# Patient Record
Sex: MALE | Race: WHITE | NOT HISPANIC OR LATINO | Employment: FULL TIME | URBAN - METROPOLITAN AREA
[De-identification: names, ages, dates, MRNs, and addresses within clinical notes are randomized per-mention and may not be internally consistent; named-entity substitution may affect disease eponyms.]

---

## 2017-09-13 ENCOUNTER — HOSPITAL ENCOUNTER (EMERGENCY)
Facility: HOSPITAL | Age: 32
Discharge: HOME | End: 2017-09-13
Attending: EMERGENCY MEDICINE
Payer: OTHER MISCELLANEOUS

## 2017-09-13 VITALS
OXYGEN SATURATION: 100 % | RESPIRATION RATE: 18 BRPM | DIASTOLIC BLOOD PRESSURE: 90 MMHG | HEIGHT: 67 IN | SYSTOLIC BLOOD PRESSURE: 135 MMHG | BODY MASS INDEX: 25.9 KG/M2 | TEMPERATURE: 99 F | WEIGHT: 165 LBS | HEART RATE: 79 BPM

## 2017-09-13 DIAGNOSIS — M79.672 LEFT FOOT PAIN: Primary | ICD-10-CM
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
## 2017-09-13 DIAGNOSIS — S99.929A FOOT TRAUMA: ICD-10-CM
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
## 2017-09-13 DIAGNOSIS — T14.90XA INJURY: ICD-10-CM

## 2017-09-13 PROCEDURE — 90471 IMMUNIZATION ADMIN: CPT | Performed by: EMERGENCY MEDICINE

## 2017-09-13 PROCEDURE — 90715 TDAP VACCINE 7 YRS/> IM: CPT | Performed by: EMERGENCY MEDICINE

## 2017-09-13 PROCEDURE — 29515 APPLICATION SHORT LEG SPLINT: CPT

## 2017-09-13 PROCEDURE — 99283 EMERGENCY DEPT VISIT LOW MDM: CPT | Mod: 25

## 2017-09-13 PROCEDURE — 25000003 PHARM REV CODE 250: Performed by: EMERGENCY MEDICINE

## 2017-09-13 PROCEDURE — 63600175 PHARM REV CODE 636 W HCPCS: Performed by: EMERGENCY MEDICINE

## 2017-09-13 PROCEDURE — 99284 EMERGENCY DEPT VISIT MOD MDM: CPT | Mod: ,,, | Performed by: PHYSICIAN ASSISTANT

## 2017-09-13 RX ORDER — NAPROXEN 500 MG/1
500 TABLET ORAL
Status: COMPLETED | OUTPATIENT
Start: 2017-09-13 | End: 2017-09-13

## 2017-09-13 RX ORDER — NAPROXEN 500 MG/1
500 TABLET ORAL 2 TIMES DAILY WITH MEALS
Qty: 20 TABLET | Refills: 0 | Status: SHIPPED | OUTPATIENT
Start: 2017-09-13 | End: 2019-11-07

## 2017-09-13 RX ADMIN — CLOSTRIDIUM TETANI TOXOID ANTIGEN (FORMALDEHYDE INACTIVATED), CORYNEBACTERIUM DIPHTHERIAE TOXOID ANTIGEN (FORMALDEHYDE INACTIVATED), BORDETELLA PERTUSSIS TOXOID ANTIGEN (GLUTARALDEHYDE INACTIVATED), BORDETELLA PERTUSSIS FILAMENTOUS HEMAGGLUTININ ANTIGEN (FORMALDEHYDE INACTIVATED), BORDETELLA PERTUSSIS PERTACTIN ANTIGEN, AND BORDETELLA PERTUSSIS FIMBRIAE 2/3 ANTIGEN 0.5 ML: 5; 2; 2.5; 5; 3; 5 INJECTION, SUSPENSION INTRAMUSCULAR at 07:09

## 2017-09-13 RX ADMIN — NAPROXEN 500 MG: 500 TABLET ORAL at 07:09

## 2017-09-13 NOTE — ED TRIAGE NOTES
Presents to ER with abrasion atop left foot after a heavy object fell on it this am.  Swelling noted to foot but is able to wiggle his toes without difficulty.    GENERAL: The patient is well-developed and well-nourished in no apparent distress. Alert and oriented x4.                                                HEENT: Head is normocephalic and atraumatic. Extraocular muscles are intact. Pupils are equal, round, and reactive to light and accommodation. Nares appeared normal. Mouth is well hydrated and without lesions. Mucous membranes are moist. Posterior pharynx clear of any exudate or lesions.    NECK: Supple. No carotid bruits. No lymphadenopathy or thyromegaly.    LUNGS: Clear to auscultation.    HEART: Regular rate and rhythm without murmur.     ABDOMEN: Soft, nontender, and nondistended. Positive bowel sounds. No hepatosplenomegaly was noted.     EXTREMITIES: Without any cyanosis, clubbing, rash, or lesions.    NEUROLOGIC: Cranial nerves II through XII are grossly intact.     PSYCHIATRIC: Flat affect, but denies suicidal or homicidal ideations.    SKIN: No ulceration or induration present.

## 2017-09-13 NOTE — ED PROVIDER NOTES
Encounter Date: 9/13/2017    SCRIBE #1 NOTE: I, Juani Robb, am scribing for, and in the presence of,  Dr. Douglas. I have scribed the following portions of the note - the APC attestation.       History     Chief Complaint   Patient presents with    Foot Injury     was at work, elevated platforms rolled onto left foot     32-year-old white male with no significant past medical history presents to the ED complaining of left foot pain after a stack of platforms fell onto his foot while at work around 6:45 AM.  He has taken some Tylenol before he went to work today but has not taken any additional pain medication prior to arrival.  He was wearing boots when this occurred and sustained a small abrasion to the top of the foot.  He is unsure when his last tetanus vaccine was.  He's complaining of a 3-4/10 left foot pain that is worse with weightbearing.  He denies any past surgical history to the left foot.  He denies fever, chills, chest pain, shortness breath, numbness, paresthesias, headache.  He socially drinks alcohol and denies tobacco or drug use.      The history is provided by the patient.     Review of patient's allergies indicates:  No Known Allergies    History reviewed. No pertinent past medical history.  History reviewed. No pertinent surgical history.  Family History   Problem Relation Age of Onset    No Known Problems Mother     No Known Problems Father      Social History   Substance Use Topics    Smoking status: Never Smoker    Smokeless tobacco: Never Used    Alcohol use Yes      Comment: occa     Review of Systems   Constitutional: Negative for chills and fever.   HENT: Negative for congestion, rhinorrhea and sore throat.    Eyes: Negative for photophobia and visual disturbance.   Respiratory: Negative for shortness of breath.    Cardiovascular: Negative for chest pain.   Gastrointestinal: Negative for abdominal pain, constipation, diarrhea, nausea and vomiting.   Genitourinary: Negative for  dysuria and hematuria.   Musculoskeletal: Positive for arthralgias, gait problem and joint swelling.   Skin: Positive for wound.   Neurological: Negative for weakness, numbness and headaches.   Psychiatric/Behavioral: Negative for confusion.       Physical Exam     Initial Vitals [09/13/17 0745]   BP Pulse Resp Temp SpO2   (!) 135/90 79 18 98.1 °F (36.7 °C) 100 %      MAP       105         Physical Exam    Nursing note and vitals reviewed.  Constitutional: He appears well-developed and well-nourished. He is not diaphoretic. No distress.   HENT:   Head: Normocephalic and atraumatic.   Neck: Normal range of motion. Neck supple.   Cardiovascular: Normal rate, regular rhythm and normal heart sounds. Exam reveals no gallop and no friction rub.    No murmur heard.  Pulmonary/Chest: Breath sounds normal. He has no wheezes. He has no rhonchi. He has no rales.   Abdominal: Soft. Bowel sounds are normal. There is no tenderness. There is no rebound and no guarding.   Musculoskeletal: Normal range of motion.        Left ankle: He exhibits normal range of motion, no swelling and no deformity. No tenderness.        Left foot: There is tenderness, bony tenderness and swelling. There is normal capillary refill and no deformity.        Feet:    L pedal pulses 2+   Neurological: He is alert and oriented to person, place, and time. He has normal strength. No sensory deficit.   Skin: Skin is warm and dry.   Psychiatric: He has a normal mood and affect.         ED Course   Procedures  Labs Reviewed - No data to display     Imaging Results          X-Ray Foot Complete Left (Final result)  Result time 09/13/17 08:29:35    Final result by Adam Herrera MD (09/13/17 08:29:35)                 Impression:     Normal findings of      Electronically signed by: Adam Herrera MD  Date:     09/13/17  Time:    08:29              Narrative:    Bony structures of the left foot are normal no fracture or dislocation is seen.                              X-Ray Ankle Complete Left (Final result)  Result time 09/13/17 08:30:00    Final result by Adam Herrera MD (09/13/17 08:30:00)                 Impression:     Normal findings      Electronically signed by: Adam Herrera MD  Date:     09/13/17  Time:    08:29              Narrative:    Left ankle is normal ankle mortise is preserved no fracture or other abnormalities                               Medical Decision Making:   History:   Old Medical Records: I decided to obtain old medical records.  Clinical Tests:   Radiological Study: Ordered and Reviewed       APC / Resident Notes:   32-year-old white male with no significant past medical history presents to the ED complaining of left foot pain after a stack of platforms fell onto his foot while at work around 6:45 AM.  Vital signs stable.  Regular rate and rhythm.  Lungs are clear.  Abdomen is soft and nontender.  There is a small abrasion noted to the left foot without any active bleeding.  There is underlying bony tenderness.  Left pedal pulse 2+.  Normal capillary refill.  Differential diagnosis includes but isn't limited to fracture, dislocation, musculoskeletal injury.  Will obtain x-rays for further evaluation.    Tetanus vaccine updated in the ED.    Xray L foot and ankle with no acute fracture or dislocation.     L foot placed in walking boot and patient was provided with crutches prior to discharge.  He was discharged with a prescription for naproxen.  He will follow up with his PCP and orthopedics.  All of the patient's questions were answered.  I reviewed the patient's chart and imaging and discussed the case with my supervising physician.          Scribe Attestation:   Scribe #1: I performed the above scribed service and the documentation accurately describes the services I performed. I attest to the accuracy of the note.    Attending Attestation:     Physician Attestation Statement for NP/PA:   I discussed this assessment and plan of  this patient with the NP/PA, but I did not personally examine the patient. The face to face encounter was performed by the NP/PA.        Physician Attestation for Scribe:  Physician Attestation Statement for Scribe #1: I, Dr. Douglas, reviewed documentation, as scribed by Juani Robb in my presence, and it is both accurate and complete.                 ED Course      Clinical Impression:   The primary encounter diagnosis was Left foot pain. Diagnoses of Injury and Foot trauma were also pertinent to this visit.    Disposition:   Disposition: Discharged  Condition: Stable                        Yuliet Ramsay PA-C  09/13/17 5399

## 2017-09-14 ENCOUNTER — HOSPITAL ENCOUNTER (OUTPATIENT)
Dept: RADIOLOGY | Facility: HOSPITAL | Age: 32
Discharge: HOME | End: 2017-09-14
Attending: ORTHOPAEDIC SURGERY
Payer: OTHER MISCELLANEOUS

## 2017-09-14 ENCOUNTER — TELEPHONE (OUTPATIENT)
Dept: ORTHOPEDICS | Facility: CLINIC | Age: 32
End: 2017-09-14

## 2017-09-14 ENCOUNTER — OFFICE VISIT (OUTPATIENT)
Dept: ORTHOPEDICS | Facility: CLINIC | Age: 32
End: 2017-09-14
Payer: OTHER MISCELLANEOUS

## 2017-09-14 DIAGNOSIS — M79.672 LEFT FOOT PAIN: ICD-10-CM

## 2017-09-14 DIAGNOSIS — M79.672 LEFT FOOT PAIN: Primary | ICD-10-CM

## 2017-09-14 DIAGNOSIS — S97.82XA CRUSH INJURY OF LEFT FOOT, INITIAL ENCOUNTER: ICD-10-CM

## 2017-09-14 PROCEDURE — 73630 X-RAY EXAM OF FOOT: CPT | Mod: 26,LT,, | Performed by: RADIOLOGY

## 2017-09-14 PROCEDURE — 99203 OFFICE O/P NEW LOW 30 MIN: CPT | Mod: S$GLB,,, | Performed by: ORTHOPAEDIC SURGERY

## 2017-09-14 PROCEDURE — 99999 PR PBB SHADOW E&M-EST. PATIENT-LVL I: CPT | Mod: PBBFAC,,, | Performed by: ORTHOPAEDIC SURGERY

## 2017-09-14 PROCEDURE — 73630 X-RAY EXAM OF FOOT: CPT | Mod: TC,LT

## 2017-09-14 PROCEDURE — 3008F BODY MASS INDEX DOCD: CPT | Mod: S$GLB,,, | Performed by: ORTHOPAEDIC SURGERY

## 2017-09-14 NOTE — LETTER
September 15, 2017      [facility]  [address] [address]           [facility]  [address]  [facility] [address]  Phone: [phone]          Patient: Adonay Martell   MR Number: [id number]   YOB: 1985   Date of Visit: 9/14/2017       Dear [facility]:    Thank you for referring Adonay Martell to me for evaluation. Attached you will find relevant portions of my assessment and plan of care.    If you have questions, please do not hesitate to call me. I look forward to following Adonay Martell along with you.    Sincerely,    Cristobal Ortiz MD    Enclosure  CC:  No Recipients    If you would like to receive this communication electronically, please contact [email or web address] or [phone] to request more information on [employer] access.    For providers and/or their staff who would like to refer a patient to [facility], please contact us through our one-stop-shop provider referral line, [facility], at [phone].    If you feel you have received this communication in error or would no longer like to receive these types of communications, please e-mail [email or web address]

## 2017-09-14 NOTE — TELEPHONE ENCOUNTER
----- Message from Nayely Trevino sent at 9/14/2017 12:10 PM CDT -----  Contact: self   Pt is requesting a call back regarding being seen for a follow up appt from his work injury for his Lt foot.  Pt would like to know if he can be seen for his WC case. [phone]

## 2017-09-22 ENCOUNTER — OFFICE VISIT (OUTPATIENT)
Dept: ORTHOPEDICS | Facility: CLINIC | Age: 32
End: 2017-09-22
Payer: OTHER MISCELLANEOUS

## 2017-09-22 VITALS — WEIGHT: 165 LBS | BODY MASS INDEX: 25.9 KG/M2 | HEIGHT: 67 IN

## 2017-09-22 DIAGNOSIS — S97.82XA CRUSH INJURY OF LEFT FOOT, INITIAL ENCOUNTER: Primary | ICD-10-CM

## 2017-09-22 DIAGNOSIS — M79.672 LEFT FOOT PAIN: ICD-10-CM

## 2017-09-22 PROCEDURE — 99999 PR PBB SHADOW E&M-EST. PATIENT-LVL II: CPT | Mod: PBBFAC,,, | Performed by: ORTHOPAEDIC SURGERY

## 2017-09-22 PROCEDURE — 3008F BODY MASS INDEX DOCD: CPT | Mod: S$GLB,,, | Performed by: ORTHOPAEDIC SURGERY

## 2017-09-22 PROCEDURE — 99213 OFFICE O/P EST LOW 20 MIN: CPT | Mod: S$GLB,,, | Performed by: ORTHOPAEDIC SURGERY

## 2017-09-23 NOTE — PROGRESS NOTES
Mr. Martell returns today for followup.  It has been about nine days since he had   over a 1000-pound load fall on the top of his left foot.  I saw him a day after   his injury and he was noted to have significant swelling and ecchymosis of the   left foot with a small abrasion on the dorsum of the foot.  Plain x-rays did not   show any obvious fractures.  He was able to do a weightbearing film on the left   foot and it did not show any evidence of midfoot instability.  I had him come   back today for a recheck.  He still reports significant pain.  He still has some   neurogenic symptoms in his toes.  The abrasion on the top of his foot is well   healed.  His swelling  overall is improved, but he states it does get worse   at the end of the day.  A lot of the ecchymosis has settled into his toes.  He   has mild tenderness in the midfoot.  At this point, I would have him continue   with the fracture boot and limited weightbearing.  Because of the amount of the   load that fell on his foot, I would like to get a CT scan at this time to rule   out any occult fracture that we might be missing.  He is going to have to remain   out of work for at least the next several weeks.  I am going to have him return   to see me in about three to four weeks with the results of the CT scan.      MEREDITH/IGGY  dd: 09/22/2017 11:19:04 (CDT)  td: 09/23/2017 00:41:17 (VICKI)  Doc ID   #[id number]  Job ID #[id number]    CC:

## 2017-09-27 ENCOUNTER — HOSPITAL ENCOUNTER (OUTPATIENT)
Dept: RADIOLOGY | Facility: HOSPITAL | Age: 32
Discharge: HOME | End: 2017-09-27
Attending: ORTHOPAEDIC SURGERY
Payer: OTHER MISCELLANEOUS

## 2017-09-27 DIAGNOSIS — S97.82XA CRUSH INJURY OF LEFT FOOT, INITIAL ENCOUNTER: ICD-10-CM

## 2017-09-27 DIAGNOSIS — M79.672 LEFT FOOT PAIN: ICD-10-CM

## 2017-09-27 PROCEDURE — 73700 CT LOWER EXTREMITY W/O DYE: CPT | Mod: 26,LT,, | Performed by: RADIOLOGY

## 2017-09-27 PROCEDURE — 73700 CT LOWER EXTREMITY W/O DYE: CPT | Mod: TC,LT

## 2017-10-12 ENCOUNTER — OFFICE VISIT (OUTPATIENT)
Dept: ORTHOPEDICS | Facility: CLINIC | Age: 32
End: 2017-10-12
Payer: OTHER MISCELLANEOUS

## 2017-10-12 DIAGNOSIS — S97.82XA CRUSH INJURY OF LEFT FOOT, INITIAL ENCOUNTER: Primary | ICD-10-CM

## 2017-10-12 PROCEDURE — 99213 OFFICE O/P EST LOW 20 MIN: CPT | Mod: S$GLB,,, | Performed by: ORTHOPAEDIC SURGERY

## 2017-10-13 NOTE — PROGRESS NOTES
HISTORY OF PRESENT ILLNESS:  Mr. Martell returns today for followup.  It has been   about a month since he sustained a crush injury to the top of his left foot.  I   last saw him about three weeks ago; at which time, he still has significant   swelling.    X-rays initially did not show any obvious abnormalities.  Because of the amount   of weight that fell on his foot, I wanted to get a CT scan to make sure that we   were not missing any underlying occult bony injury.  He had a CT scan done a   couple of weeks ago and it did not show any obvious bony injury.  All alignment   of the bones was good.  At this point, Mr. Martell reports his pain is markedly   improved.  He is fully weightbearing with shoes on.  He only notes some deficits   in the motion of his toes, which I think, will come back with time.  At this   point, I am going to allow him to return to work next week.  He should probably   avoid any significant high-impact activity for the next six to eight weeks.  I   am going to have him return to see me as needed.      MEREDTIH/IGGY  dd: 10/12/2017 07:45:40 (CDVALERY)  td: 10/12/2017 20:56:53 (VICKI)  Doc ID   #[id number]  Job ID #[id number]    CC:

## 2017-12-18 ENCOUNTER — HOSPITAL ENCOUNTER (OUTPATIENT)
Dept: RADIOLOGY | Facility: HOSPITAL | Age: 32
Discharge: HOME | End: 2017-12-18
Attending: FAMILY MEDICINE
Payer: COMMERCIAL

## 2017-12-18 ENCOUNTER — OFFICE VISIT (OUTPATIENT)
Dept: INTERNAL MEDICINE | Facility: CLINIC | Age: 32
End: 2017-12-18
Payer: COMMERCIAL

## 2017-12-18 VITALS
TEMPERATURE: 98 F | HEART RATE: 85 BPM | SYSTOLIC BLOOD PRESSURE: 124 MMHG | HEIGHT: 67 IN | DIASTOLIC BLOOD PRESSURE: 86 MMHG | BODY MASS INDEX: 26.96 KG/M2 | WEIGHT: 171.75 LBS | RESPIRATION RATE: 18 BRPM

## 2017-12-18 DIAGNOSIS — R07.89 CHEST WALL PAIN: ICD-10-CM

## 2017-12-18 DIAGNOSIS — R05.9 COUGH: ICD-10-CM

## 2017-12-18 DIAGNOSIS — Z00.00 ROUTINE MEDICAL EXAM: ICD-10-CM

## 2017-12-18 DIAGNOSIS — Z00.00 ROUTINE MEDICAL EXAM: Primary | ICD-10-CM

## 2017-12-18 PROCEDURE — 99385 PREV VISIT NEW AGE 18-39: CPT | Mod: S$GLB,,, | Performed by: FAMILY MEDICINE

## 2017-12-18 PROCEDURE — 99999 PR PBB SHADOW E&M-EST. PATIENT-LVL III: CPT | Mod: PBBFAC,,, | Performed by: FAMILY MEDICINE

## 2017-12-18 PROCEDURE — 71020 XR CHEST PA AND LATERAL: CPT | Mod: TC,PO

## 2017-12-18 PROCEDURE — 71020 XR CHEST PA AND LATERAL: CPT | Mod: 26,,, | Performed by: RADIOLOGY

## 2017-12-19 ENCOUNTER — LAB VISIT (OUTPATIENT)
Dept: LAB | Facility: HOSPITAL | Age: 32
End: 2017-12-19
Attending: FAMILY MEDICINE
Payer: COMMERCIAL

## 2017-12-19 DIAGNOSIS — Z00.00 ROUTINE MEDICAL EXAM: ICD-10-CM

## 2017-12-19 LAB
ALBUMIN SERPL BCP-MCNC: 4.1 G/DL
ALP SERPL-CCNC: 75 U/L
ALT SERPL W/O P-5'-P-CCNC: 17 U/L
ANION GAP SERPL CALC-SCNC: 7 MMOL/L
AST SERPL-CCNC: 21 U/L
BASOPHILS # BLD AUTO: 0.09 K/UL
BASOPHILS NFR BLD: 1 %
BILIRUB SERPL-MCNC: 0.3 MG/DL
BUN SERPL-MCNC: 28 MG/DL
CALCIUM SERPL-MCNC: 9.2 MG/DL
CHLORIDE SERPL-SCNC: 105 MMOL/L
CHOLEST SERPL-MCNC: 175 MG/DL
CHOLEST/HDLC SERPL: 4.6 {RATIO}
CO2 SERPL-SCNC: 25 MMOL/L
CREAT SERPL-MCNC: 1 MG/DL
DIFFERENTIAL METHOD: NORMAL
EOSINOPHIL # BLD AUTO: 0.3 K/UL
EOSINOPHIL NFR BLD: 3.4 %
ERYTHROCYTE [DISTWIDTH] IN BLOOD BY AUTOMATED COUNT: 12.9 %
EST. GFR  (AFRICAN AMERICAN): >60 ML/MIN/1.73 M^2
EST. GFR  (NON AFRICAN AMERICAN): >60 ML/MIN/1.73 M^2
GLUCOSE SERPL-MCNC: 102 MG/DL
HCT VFR BLD AUTO: 44.9 %
HDLC SERPL-MCNC: 38 MG/DL
HDLC SERPL: 21.7 %
HGB BLD-MCNC: 15 G/DL
IMM GRANULOCYTES # BLD AUTO: 0.02 K/UL
IMM GRANULOCYTES NFR BLD AUTO: 0.2 %
LDLC SERPL CALC-MCNC: 94.2 MG/DL
LYMPHOCYTES # BLD AUTO: 3 K/UL
LYMPHOCYTES NFR BLD: 34.8 %
MCH RBC QN AUTO: 30.1 PG
MCHC RBC AUTO-ENTMCNC: 33.4 G/DL
MCV RBC AUTO: 90 FL
MONOCYTES # BLD AUTO: 0.7 K/UL
MONOCYTES NFR BLD: 8.6 %
NEUTROPHILS # BLD AUTO: 4.5 K/UL
NEUTROPHILS NFR BLD: 52 %
NONHDLC SERPL-MCNC: 137 MG/DL
NRBC BLD-RTO: 0 /100 WBC
PLATELET # BLD AUTO: 251 K/UL
PMV BLD AUTO: 11.8 FL
POTASSIUM SERPL-SCNC: 4.8 MMOL/L
PROT SERPL-MCNC: 7.5 G/DL
RBC # BLD AUTO: 4.98 M/UL
SODIUM SERPL-SCNC: 137 MMOL/L
T4 FREE SERPL-MCNC: 0.87 NG/DL
TRIGL SERPL-MCNC: 214 MG/DL
TSH SERPL DL<=0.005 MIU/L-ACNC: 2.36 UIU/ML
WBC # BLD AUTO: 8.61 K/UL

## 2017-12-19 PROCEDURE — 36415 COLL VENOUS BLD VENIPUNCTURE: CPT | Mod: PO

## 2017-12-19 PROCEDURE — 84439 ASSAY OF FREE THYROXINE: CPT

## 2017-12-19 PROCEDURE — 80061 LIPID PANEL: CPT

## 2017-12-19 PROCEDURE — 80053 COMPREHEN METABOLIC PANEL: CPT

## 2017-12-19 PROCEDURE — 85025 COMPLETE CBC W/AUTO DIFF WBC: CPT

## 2017-12-19 PROCEDURE — 84443 ASSAY THYROID STIM HORMONE: CPT

## 2017-12-19 NOTE — PROGRESS NOTES
Subjective:   Patient ID: Adonay Martell is a 32 y.o. male.    Chief Complaint: Establish Care      HPI  33 yo male here to est w pcp. No specific concerns but asks about screening studies and I give educ on such.    Patient queried and denies any further complaints    PREVENTIVE MED  Diet  Exercise  Colorectal Ca  Alcohol use  Tobacco  BP  Depression  Type 2 DM  Hep C  STD  Vision  ALL REVIEWED      PAST MEDICAL HISTORY:  History reviewed. No pertinent past medical history.    PAST SURGICAL HISTORY:  History reviewed. No pertinent surgical history.    SOCIAL HISTORY:  Social History     Social History    Marital status:      Spouse name: N/A    Number of children: N/A    Years of education: N/A     Occupational History    Not on file.     Social History Main Topics    Smoking status: Never Smoker    Smokeless tobacco: Never Used    Alcohol use Yes      Comment: occa    Drug use: No    Sexual activity: Yes     Partners: Female     Birth control/ protection: Condom     Other Topics Concern    Not on file     Social History Narrative    No narrative on file       FAMILY HISTORY:  Family History   Problem Relation Age of Onset    No Known Problems Mother     No Known Problems Father        ALLERGIES AND MEDICATIONS: updated and reviewed.  Review of patient's allergies indicates:  No Known Allergies    Current Outpatient Prescriptions:     naproxen (NAPROSYN) 500 MG tablet, Take 1 tablet (500 mg total) by mouth 2 (two) times daily with meals., Disp: 20 tablet, Rfl: 0    Review of Systems   Constitutional: Negative for activity change, appetite change, chills, diaphoresis, fatigue, fever and unexpected weight change.   HENT: Negative for congestion, ear discharge, ear pain, postnasal drip, rhinorrhea, sneezing and sore throat.    Eyes: Negative for photophobia and discharge.   Respiratory: Negative for cough, chest tightness, shortness of breath and wheezing.    Cardiovascular: Negative for  "chest pain and palpitations.   Gastrointestinal: Negative for abdominal distention, abdominal pain, diarrhea, nausea and vomiting.   Genitourinary: Negative for dysuria.   Musculoskeletal: Negative for arthralgias and neck pain.   Skin: Negative for rash.   Neurological: Negative for headaches.       Objective:     Vitals:    12/18/17 0956   BP: 124/86   Pulse: 85   Resp: 18   Temp: 98.2 °F (36.8 °C)   TempSrc: Oral   Weight: 77.9 kg (171 lb 11.8 oz)   Height: 5' 7" (1.702 m)   PainSc: 0-No pain     Body mass index is 26.9 kg/m².    Physical Exam   Constitutional: He appears well-developed and well-nourished.   HENT:   Head: Normocephalic and atraumatic.   Right Ear: Hearing, tympanic membrane, external ear and ear canal normal. No drainage or swelling. No decreased hearing is noted.   Left Ear: Hearing, tympanic membrane, external ear and ear canal normal. No drainage or swelling. No decreased hearing is noted.   Nose: Nose normal. No rhinorrhea.   Mouth/Throat: Oropharynx is clear and moist. No oropharyngeal exudate, posterior oropharyngeal edema or posterior oropharyngeal erythema.   Eyes: Conjunctivae, EOM and lids are normal. Pupils are equal, round, and reactive to light. Right eye exhibits no discharge and no exudate. Left eye exhibits no discharge and no exudate. Right conjunctiva is not injected. Left conjunctiva is not injected.   Neck: Trachea normal and full passive range of motion without pain. Normal carotid pulses, no hepatojugular reflux and no JVD present. Carotid bruit is not present. No neck rigidity. No edema and no erythema present. No thyroid mass and no thyromegaly present.   Cardiovascular: Normal rate, regular rhythm and normal heart sounds.    Pulmonary/Chest: Effort normal. No respiratory distress. He has no wheezes.   Abdominal: Soft. Normal appearance and bowel sounds are normal. There is no tenderness. There is negative Moon's sign.   Lymphadenopathy:     He has no cervical " adenopathy.   Neurological: He is alert.   Skin: Skin is warm and dry.   Psychiatric: He has a normal mood and affect. His speech is normal and behavior is normal.       Assessment and Plan:   Adonay was seen today for establish care.    Diagnoses and all orders for this visit:    Routine medical exam  -     CBC auto differential; Future  -     Comprehensive metabolic panel; Future  -     Lipid panel; Future  -     TSH; Future  -     T4, free; Future  -     X-Ray Chest PA And Lateral; Future    Chest wall pain  -     X-Ray Chest PA And Lateral; Future    Cough  -     X-Ray Chest PA And Lateral; Future        Return in about 1 year (around 12/18/2018) for annual.         THIS NOTE WILL BE SHARED WITH THE PATIENT.

## 2018-12-11 ENCOUNTER — TELEPHONE (OUTPATIENT)
Dept: OPHTHALMOLOGY | Facility: CLINIC | Age: 33
End: 2018-12-11

## 2018-12-11 NOTE — TELEPHONE ENCOUNTER
----- Message from Caitlin Nicholas sent at 12/11/2018 10:50 AM CST -----  Contact: Mrs. Martell  .[facility]  OPHTHALMOLOGY TRIAGE CALL    Date:  12/11/2018   Time:  10:52 AM  Person Calling: Bambi Martell  Phone Number:  [phone](cell)   Call received by:  Caitlin Nicholas  Patient in Clinic now:  No  Which eye:  Left  Name of Patient's Eye Dr.:  N/A  Date Last Seen:    Disposition of patient:Pt scratched his eye this weekend and went to an urgent care. Corneal abrasion was treated with Rx eye drops. Symptoms have worsened.    For How Long:3 days.    Additional Comments:

## 2018-12-12 ENCOUNTER — OFFICE VISIT (OUTPATIENT)
Dept: OPTOMETRY | Facility: CLINIC | Age: 33
End: 2018-12-12
Payer: COMMERCIAL

## 2018-12-12 DIAGNOSIS — S05.02XA CORNEA ABRASION, LEFT, INITIAL ENCOUNTER: Primary | ICD-10-CM

## 2018-12-12 DIAGNOSIS — H18.529 ABMD (ANTERIOR BASEMENT MEMBRANE DYSTROPHY): ICD-10-CM

## 2018-12-12 PROCEDURE — 92004 COMPRE OPH EXAM NEW PT 1/>: CPT | Mod: S$GLB,,, | Performed by: OPTOMETRIST

## 2018-12-12 PROCEDURE — 99999 PR PBB SHADOW E&M-EST. PATIENT-LVL III: CPT | Mod: PBBFAC,,, | Performed by: OPTOMETRIST

## 2018-12-12 RX ORDER — GENTAMICIN SULFATE 3 MG/ML
1 SOLUTION/ DROPS OPHTHALMIC 3 TIMES DAILY
COMMUNITY
End: 2019-11-07

## 2018-12-12 NOTE — PATIENT INSTRUCTIONS
Please continue the Gentamicin drops: 1 drop, left eye, 4 times per day, through Sunday. On Sunday night you can start using Genteal Gel or Refresh PM in the left eye every evening. This gel is very thick and may cause blurred vision, so we recommend you use it before bedtime. In the morning you can gently wipe your eyes with a damp washcloth to remove any residual gel.

## 2018-12-12 NOTE — PROGRESS NOTES
HPI     Mr. Adonay Martell is here for urgent care follow-up.    4 days ago he accidentally poke himself in the left eye with his thumb.   Eye became painful and watery. He went to Urgent Care 2 days ago, was   diagnosed with corneal abrasion and prescribed Gentamicin drops QID OS.   Pain and tearing have now resolved, but still has mild light sensitivity   and starbursts around lights OS. He also see faint circular floaters OS x   5 days that come and go. No discharge. He does not wear glasses or contact   lenses. OD asymptomatic.    Would patient like a refraction today? no    (+)drops: Gentamicin drops QID OS (started 2 days ago)  (-)flashes  (+)floaters OS  (+)monocular diplopia OS (multiple streaks)  (-)peripheral vision obscurations     Diabetic: no    OCULAR HISTORY  Last Eye Exam: never   (-)eye surgery   Corneal abrasion OS (December 2018, pt reports h/o multiple abrasions OS   when he was younger)    FAMILY HISTORY  (-)Glaucoma         Last edited by Leanne Pizano, OD on 12/12/2018 10:23 AM. (History)            Assessment /Plan     For exam results, see Encounter Report.    Cornea abrasion, left, initial encounter  ABMD (anterior basement membrane dystrophy)   Fully re-epithelialized, but irregular with ABMD resulting in aberrations. Continue Gentamicin drops QID OS for another 4 days. Discussed risk of recurrent corneal erosion and stressed importance of OTC lubricating gel QHS OS long-term.      Note: Pt was easily able to read 20/40 at distance sc [facility] before leaving today.        RTC 1 year

## 2019-07-22 ENCOUNTER — TELEPHONE (OUTPATIENT)
Dept: ORTHOPEDICS | Facility: CLINIC | Age: 34
End: 2019-07-22

## 2019-07-22 NOTE — TELEPHONE ENCOUNTER
Pt brought paperwork to Dr Conley's office to be filled out to be released to work.  Paperwork to be completed by MD only.  Paperwork placed on Dr Ortiz's desk for review.  Advised pt that I will call him once complete.

## 2019-07-22 NOTE — TELEPHONE ENCOUNTER
----- Message from Zia Presley sent at 7/22/2019  9:28 AM CDT -----  Contact: Self/ [phone]  Patient would like a call back to see about getting a letter for his job with information saying from the Doctor that his foot his fine that he had surgery on with Dr. Zayas and fine to work. Patient said he had surgery with the doctor two years ago.

## 2019-11-07 ENCOUNTER — OFFICE VISIT (OUTPATIENT)
Dept: INTERNAL MEDICINE | Facility: CLINIC | Age: 34
End: 2019-11-07
Payer: COMMERCIAL

## 2019-11-07 ENCOUNTER — HOSPITAL ENCOUNTER (OUTPATIENT)
Dept: RADIOLOGY | Facility: HOSPITAL | Age: 34
Discharge: HOME | End: 2019-11-07
Attending: INTERNAL MEDICINE
Payer: COMMERCIAL

## 2019-11-07 VITALS
OXYGEN SATURATION: 98 % | DIASTOLIC BLOOD PRESSURE: 68 MMHG | HEIGHT: 67 IN | SYSTOLIC BLOOD PRESSURE: 110 MMHG | HEART RATE: 64 BPM | WEIGHT: 172.19 LBS | BODY MASS INDEX: 27.02 KG/M2 | TEMPERATURE: 98 F

## 2019-11-07 DIAGNOSIS — Z87.01 HISTORY OF PNEUMONIA: Primary | ICD-10-CM

## 2019-11-07 DIAGNOSIS — Z87.01 HISTORY OF PNEUMONIA: ICD-10-CM

## 2019-11-07 PROCEDURE — 99999 PR PBB SHADOW E&M-EST. PATIENT-LVL III: ICD-10-PCS | Mod: PBBFAC,,, | Performed by: INTERNAL MEDICINE

## 2019-11-07 PROCEDURE — 99214 OFFICE O/P EST MOD 30 MIN: CPT | Mod: S$GLB,,, | Performed by: INTERNAL MEDICINE

## 2019-11-07 PROCEDURE — 71046 X-RAY EXAM CHEST 2 VIEWS: CPT | Mod: TC

## 2019-11-07 PROCEDURE — 71046 X-RAY EXAM CHEST 2 VIEWS: CPT | Mod: 26,,, | Performed by: RADIOLOGY

## 2019-11-07 PROCEDURE — 3008F BODY MASS INDEX DOCD: CPT | Mod: CPTII,S$GLB,, | Performed by: INTERNAL MEDICINE

## 2019-11-07 PROCEDURE — 3008F PR BODY MASS INDEX (BMI) DOCUMENTED: ICD-10-PCS | Mod: CPTII,S$GLB,, | Performed by: INTERNAL MEDICINE

## 2019-11-07 PROCEDURE — 99214 PR OFFICE/OUTPT VISIT, EST, LEVL IV, 30-39 MIN: ICD-10-PCS | Mod: S$GLB,,, | Performed by: INTERNAL MEDICINE

## 2019-11-07 PROCEDURE — 71046 XR CHEST PA AND LATERAL: ICD-10-PCS | Mod: 26,,, | Performed by: RADIOLOGY

## 2019-11-07 PROCEDURE — 99999 PR PBB SHADOW E&M-EST. PATIENT-LVL III: CPT | Mod: PBBFAC,,, | Performed by: INTERNAL MEDICINE

## 2019-11-07 NOTE — PROGRESS NOTES
[facility]    PROGRESS note    PRESENTING HISTORY     Previous PCP: Adam Reaves MD  Chief Complaint/Reason for Visit:     Chief Complaint   Patient presents with    Pneumonia     History of Present Illness & ROS : Mr. Adonay Martell is a 34 y.o. male.    Was diagnosed with pneumonia last week in [place] and was treated with doxycycline for 7 days.  No more symptoms. Rare cough.  No SOB.  No chest pain. No fever.    He needs clearance for [employer] work.    PAST HISTORY:     No past medical history on file.    No past surgical history on file.    Family History   Problem Relation Age of Onset    No Known Problems Mother     No Known Problems Father     Amblyopia Neg Hx     Blindness Neg Hx     Cataracts Neg Hx     Glaucoma Neg Hx     Macular degeneration Neg Hx     Retinal detachment Neg Hx     Strabismus Neg Hx        Social History     Socioeconomic History    Marital status:      Spouse name: Not on file    Number of children: Not on file    Years of education: Not on file    Highest education level: Not on file   Occupational History    Not on file   Social Needs    Financial resource strain: Not on file    Food insecurity:     Worry: Not on file     Inability: Not on file    Transportation needs:     Medical: Not on file     Non-medical: Not on file   Tobacco Use    Smoking status: Never Smoker    Smokeless tobacco: Never Used   Substance and Sexual Activity    Alcohol use: Yes     Comment: occa    Drug use: No    Sexual activity: Yes     Partners: Female     Birth control/protection: Condom   Lifestyle    Physical activity:     Days per week: Not on file     Minutes per session: Not on file    Stress: Not on file   Relationships    Social connections:     Talks on phone: Not on file     Gets together: Not on file     Attends [employer] service: Not on file     Active member of club or organization: Not on file     Attends meetings of clubs or organizations: Not  on file     Relationship status: Not on file   Other Topics Concern    Not on file   Social History Narrative    Not on file       MEDICATIONS & ALLERGIES:     Current Outpatient Medications on File Prior to Visit   Medication Sig Dispense Refill   None.    Review of patient's allergies indicates:  No Known Allergies    Medications Reconciliation:   I have reconciled the patient's home medications with the patient/family. I have updated all changes.  Refer to After-Visit Medication List.    OBJECTIVE:     Vital Signs:  Vitals:    11/07/19 1441   BP: 110/68   Pulse: 64   Temp: 97.8 °F (36.6 °C)     Wt Readings from Last 1 Encounters:   11/07/19 1441 78.1 kg (172 lb 2.9 oz)     Body mass index is 26.97 kg/m².     Physical Exam:  General: Well developed, well nourished. No distress.  HEENT: Head is normocephalic, atraumatic  Eyes: Clear conjunctiva.  Neck: Supple, symmetrical neck; trachea midline.  Lungs: Clear to auscultation bilaterally and normal respiratory effort. Slight decreased BS right.  Cardiovascular: Heart with regular rate and rhythm.    Extremities: No LE edema.    Skin: Skin color, texture, turgor normal. No rashes.  Musculoskeletal: Normal gait.   Lymph Nodes: No cervical or supraclavicular adenopathy.  Neurologic: Normal strength and tone. No focal numbness or weakness.   Psychiatric: Normal affect. Alert.    Laboratory  Lab Results   Component Value Date    WBC 8.61 12/19/2017    HGB 15.0 12/19/2017    HCT 44.9 12/19/2017     12/19/2017    CHOL 175 12/19/2017    TRIG 214 (H) 12/19/2017    HDL 38 (L) 12/19/2017    ALT 17 12/19/2017    AST 21 12/19/2017     12/19/2017    K 4.8 12/19/2017     12/19/2017    CREATININE 1.0 12/19/2017    BUN 28 (H) 12/19/2017    CO2 25 12/19/2017    TSH 2.362 12/19/2017       ASSESSMENT & PLAN:     History of pneumonia  -     X-Ray Chest PA And Lateral; Future; Expected date: 11/07/2019      - Resolved. Completed doxycyline.    Will check CXR as  required by his work.    Scheduled Follow-up :  No future appointments.    After Visit Medication List :     Medication List           Accurate as of November 7, 2019  2:57 PM. If you have any questions, ask your nurse or doctor.               STOP taking these medications    gentamicin 0.3 % ophthalmic solution  Commonly known as:  GARAMYCIN  Stopped by:  Tyrone Bernal MD     naproxen 500 MG tablet  Commonly known as:  NAPROSYN  Stopped by:  Tyrone Bernal MD            Signing Physician:  Tyrone Bernal MD

## 2021-11-28 ENCOUNTER — CLINICAL SUPPORT (OUTPATIENT)
Dept: URGENT CARE | Facility: CLINIC | Age: 36
End: 2021-11-28
Payer: COMMERCIAL

## 2021-11-28 DIAGNOSIS — Z11.59 ENCOUNTER FOR SCREENING FOR OTHER VIRAL DISEASES: Primary | ICD-10-CM

## 2021-11-28 LAB
CTP QC/QA: YES
SARS-COV-2 RDRP RESP QL NAA+PROBE: NEGATIVE

## 2021-11-28 PROCEDURE — U0002: ICD-10-PCS | Mod: QW,S$GLB,, | Performed by: NURSE PRACTITIONER

## 2021-11-28 PROCEDURE — U0002 COVID-19 LAB TEST NON-CDC: HCPCS | Mod: QW,S$GLB,, | Performed by: NURSE PRACTITIONER
